# Patient Record
(demographics unavailable — no encounter records)

---

## 2024-10-29 NOTE — ASSESSMENT
[FreeTextEntry1] : -- # Nephrotic syndrome (FSGS/podocytopothy) in remission on  bid for 2 years now with recurrence on CSA 50/25. CSA stopped due to BANDAR c/w ATN related to nephrosis. No other cause identified. Switched to MMF. * Cont MMF 1000 bid. * Recheck labs. * Second opinion with Dr. Butcher for consideration of rituximab or other therapies. D/W him. Will wait until remission achieved to consider. Ritixumab not proven for steroid-resistant disease. * Therapies for kidney disease: blood pressure control; MMF; other evidence-based therapies recommended including exercise, a plant-based lower oxalate diet, and 400 mcg folic acid daily * Cardiovascular disease prevention: counseling on healthy diet, physical acivity, weight loss, alcohol limitation, blood pressure control * A counseling information sheet on CKD has been given (which they have been instructed to read). * The patient has been counseled that chronic kidney disease is a significant condition and regular office follow-up with me (at least every 2-3 month for now) is important for monitoring and their health, and that it is their responsibility to make a follow-up appointment. * The patient has been counseled never to stop taking their medications without discussing it with me or another doctor. * The patient has been counseled on avoiding NSAIDs. * The patient has been counseled on risk of worsening kidney function and instructed to immediately call and speak with me and go immediately to ER with any severe symptoms, nausea, vomiting, diarrhea, chest pain, or shortness of breath.  # HTN/fluid overload. * Continue torsemide 20 qd, coreg. Will hold on adding losartan given relatively controlled BP.  * A counseling information sheet on blood pressure and staying healthy has been given (which they have been instructed to read). * The patient has been counseled to check their BP at home with an automatic arm cuff, write down the readings, and reach me directly on the phone immediately if they are persistently > 180 systolic or if SBP is less than 100 or if lightheadedness develops. They were counseled to bring in all blood pressure readings and medications next visit. * The patient has been counseled that regular office follow-up (at least every 2-3 month for now) is important for monitoring and for their health, and that it is their responsibility to make follow up appointments. * The patient also has been counseled that they must never stop or change any medications without discussing this with me (or another physician).

## 2024-10-29 NOTE — PHYSICAL EXAM
[General Appearance - Alert] : alert [General Appearance - In No Acute Distress] : in no acute distress [Auscultation Breath Sounds / Voice Sounds] : lungs were clear to auscultation bilaterally [Heart Sounds] : normal S1 and S2 [Heart Sounds Gallop] : no gallops [Murmurs] : no murmurs [Heart Sounds Pericardial Friction Rub] : no pericardial rub [FreeTextEntry1] : pitting ankles  [Abdomen Soft] : soft [Abdomen Tenderness] : non-tender [] : no hepato-splenomegaly [Abdomen Mass (___ Cm)] : no abdominal mass palpated [Cervical Lymph Nodes Enlarged Posterior Bilaterally] : posterior cervical [Cervical Lymph Nodes Enlarged Anterior Bilaterally] : anterior cervical [Supraclavicular Lymph Nodes Enlarged Bilaterally] : supraclavicular

## 2024-10-29 NOTE — HISTORY OF PRESENT ILLNESS
[FreeTextEntry1] : Self-referred for nephrotic syndrome. PCP: Jeuss Briggs [phone] Biopsy: [facility]   * Proteinuria decreased to 3.6 grams. eGFR 44 (avg. of CKD-EPIcr & CKD-EPIcys). Albumin increased to 3.9. On MMF 1000 bid. Feeling well. * BP controlled. BP 120s at home. No SOB with exertion. No CP. No lightheadedness. Compliant with medications. * On coreg 25 mg *  1.5 mg twice daily. * Lisinopril was previously stopped due to BANDAR and hyerp K.   Previous history (): * Urine protein decreased to 3.6. Albumin increasd to 3.9. * eGFR 44 (avg. of CKD-EPIcr & CKD-EPIcys). * Edema improved, on torsemide 20 mg once daily. * BP controlled. BP 120s - 130s at home. No SOB with exertion. No CP. No lightheadedness. Compliant with medications. On MMF 1000 bid. * Following up with local physician for thyroid management. * Bicarb increased to 23. Now off sodium bicarb.   Previous history (): * Proteinuria decreased from 6.2. CKD improved, creatinine 1.35. Albumin increased to 3.6. eGFR 37 (avg. of CKD-EPIcr & CKD-EPIcys). Feels well. Edema improved. Still on diuretic. On MMF 1000 bid. * HTN borderline uncontrolled. BP 130s - 140s at home. No lightheadedness. No CP/SOB. Compliant with medications. * Following up with local physican.   Previous history (): * Proteinuria stable at 13 grams. On 1000 mg MMF twice daily. * Creatinine  to 1.55. Albumin 3.1. Edema stable. On eliquis. *  HTN uncontrolled. BP 130s - 150s at home. HR 70 - 80s. No lightheadedness. No CP/SOB. Compliant with medications.   Previous history (): * Proteinuria stable at 15 grams. * On 1000 mg mycophenolate twice daily. * HTN uncontrolled. BP 150s at home. No lightheadedness. No CP/SOB. Compliant with medications.  * Stable edema. * CKD stable, creatinine 1.42.   Previous history (): * Diarrhea starting 2 days, multiple times yesterday, but now improved today.  No watery bowel movement today. 2 BMs today. Now improving. No blood in stool, nausea or vomiting, fevers, or chills. Able to eat/drink. Not dehydrated. Covid negative. She is on 1500 mg mycophenolate. * CKD stable, creatinine 1.74.   Previous history ():  Persistent nephrotic syndrome. MMF started 1 month ago. Increase to 500 bid 3 weeks ago. 18 grams proteinuria. * Edema stable and tolerable on 20 bid torsemide. * BANDAR improved, creatinine 1.6. * Hyperkalemia improved, K 4.9.   *Previous history ():  K decresed to 4.8. Creatinine decreased to 1.96. Cyclosporine was stopped and MMF was started. Torsemide increased from 10 to 20 mg daily. Edema slightly improved.  Proteinuria 10 g /d.* HTN uncontrolled.  - 150s at home. No lightheadedness. No CP/SOB. Compliant with medications. * Albumin 2.8. On eliquis.   Previous history (): * Creatinine increased to 1.91 with K of 5.4. Lisinopril stopped. Low K diet. Torsemide increased to 20. No RBCs last visit. Swelling improved on torsemide.   Previous history (): Biopsy obtained: podocytopathy (FSGS). CSA increased to 100 bid on  for recurrence of FSGS upon tapering. Creatinine 1.15. (Disease started 2020 and in remission since 2021, recurrence 2023.) *  Took CSA this morning. * Edema stable. Feels uncomfotably swollen without siognificant diuresis with torsemide. Levothyroxine was increasd to 112.    Previous history (): * Nephrotic syndrome was in remission since 2021. Cyclosporine was initially 100 mg bid and was tapered to 50/25. Umalb 30 - 50 on . Checked U/A weekly. 1 week ago had abrupt onset of persistent 4+ proteinuria and CSA was increased to 50/50. Increase in LE edema. No chest pain/SOB. On eliquis. Knee tightness but no calf discomfort. BP controlled.  - 140s at home. No lightheadedness. No CP/SOB. Compliant with medications. She had increased lisinopril to 10 mg on her own (and will now decrease to 5).    Previous history (): * Nephrotic syndrome in remission since 2021. Cyclosporine being tapered. * HTN controlled. Bp 120 - 130s. No lightheadedness. No CP/SOB. Compliant with medications. * No albumin in urine. Creatinine stable.   Previous history ([id number]): * HTN borderline uncontrolled. BP 130s but she doesn't rest at home. No lightheadedness. No CP/SOB. Compliant with medications. * Albumin 21. * CKD stable at 1.02. * CSA level decreased to 52. No protein in urine. REMISSION ACHIEVED 2021.    Previous history (): * CKD imporved to 1.16. * No albuminuria. In remission since 2022. * * HTN controlled. No lightheadedness. No CP/SOB. Compliant with medications.  * Cyclosporine level now 60s on 75/50.  * Renal biopsy results pending. She has requested records multiple times.   Previous history ([id number]): * Creatinine decreased to 1.24. * No albuminuria. In remission since 2022. * CSA decreased to 467 to 60 (increased due to paxlovid) and was restarted. * HTN controlled. BP 130s at home. (Doesn't relax.) No lightheadedness. No CP/SOB. Compliant with medications.   Previous history ([id number]): * HTN controlled. No lightheadedness. BP 120s at home. No CP/SOB. Compliant with medications. * Swab+ for Covid on . Exposed to granddaughter. NOW SWAB ANTIGEN NEGATIVE. She started paxlovid 3 days ago.   Diagnosed with Covid by PCR/Antigen:   No severe symptoms (chest pain, light headedness, shortness of breath, feeling very ill). No moderate dyspnea (that interferes with daily activities).   Symptoms started:  Symptoms: Fatigue. No SOB or fever.   Vaccination status: x 2, no booster.   Counseling given. Counseling on warning symptoms given. These include new onset of dyspnea, worsening dyspnea, dizziness, and mental status changes such as confusion. Patients are educated about the time course of symptoms and the possible development of respiratory decline that may occur, on average, one week after the onset of illness.    In addition, I assessed the availability of support at home, ensure that they know who to call should they need assistance, and reinforce when and how to access emergency medical services.  Advised on hydration, symptomatic relief, ambulation and advancing activity, and isolation.   Previous history (): * HTN controlled. BP 120s at home. No lightheadedness. No CP/SOB. Compliant with medications. * On CSA 75/75.  Last taken 13 hours ago. No albuminuria last visit. * eGFR 36 by cystatin C. * On eliquis for AF without bleeding. * Renal biopsy results pending. She has requested records multiple times.   Previous history (): * Urine protein 30 at home. Malb 49. Cr 1.1. . Remission 2021. * HTN controlled. BP 130s at home. No lightheadedness. Compliant with medications.  * On eliquis for AF without bleeding. * Renal biopsy results pending. * CSA decreased to 100/75.   Previous history ():   * She was on steroids for 1 year in . CSA was started in  of this year. Last took cyclosprine 15 hours ago.  SHE BELIEVES REMISSION WAS ACHIEVED IN .. * 122 mg albuminuria last visit. Creatinine 1.15, eGFR 35 - 50. Albumin 4.3. * HTN controlled. No lightheadedness. Compliant with medications.  * On eliquis for AF without bleeding. * Renal biopsy results pending.   Previous history (): * Increase weight by 5 pounds but no LE swelling. * HTN controlled. No lightheadedness. * On CSA. * On eliquis for AF without bleeding.   Previous history (): Hospitalized at [facility] 2020 for fluid overload. Diagnosed with nephrotic syndrome. A kidney biopsy was performed and she has requested the records. Treated initially with steroids followed by cyclosporine. She was followed in the fellows clinic and wishes to switch her care to me. Her proteinuria is currently in remission. She has been in remission since May - 2021. Reportedly kidney function is normal. Last took CSA at 10am.    * HTN borderline uncontrolled. BP 130s/80s at home. No lightheadedness. Compliant with medications.   Vacinnated against Covid ([employer]) 2021.

## 2025-01-30 NOTE — ASSESSMENT
[FreeTextEntry1] : -- # Nephrotic syndrome (FSGS/podocytopothy) in remission on  bid for 2 years now with recurrence on CSA 50/25. CSA stopped due to BANDAR c/w ATN related to nephrosis. No other cause identified. Switched to MMF. Now in partial remission.  * Cont MMF 1000 bid. * Recheck labs before next visit.  * Second opinion with Dr. Butcher for consideration of rituximab or other therapies. D/W him. Will wait until remission achieved to consider. Ritixumab not proven for steroid-resistant disease. * Therapies for kidney disease: blood pressure control; MMF; other evidence-based therapies recommended including exercise, a plant-based lower oxalate diet, and 400 mcg folic acid daily * Cardiovascular disease prevention: counseling on healthy diet, physical acivity, weight loss, alcohol limitation, blood pressure control * A counseling information sheet on CKD has been given (which they have been instructed to read). * The patient has been counseled that chronic kidney disease is a significant condition and regular office follow-up with me (at least every 3 month for now) is important for monitoring and their health, and that it is their responsibility to make a follow-up appointment. * The patient has been counseled never to stop taking their medications without discussing it with me or another doctor. * The patient has been counseled on avoiding NSAIDs. * The patient has been counseled on risk of worsening kidney function and instructed to immediately call and speak with me and go immediately to ER with any severe symptoms, nausea, vomiting, diarrhea, chest pain, or shortness of breath.  # HTN/fluid overload. * Continue torsemide 20 qd, coreg. Will hold on adding losartan given relatively controlled BP. * A counseling information sheet on blood pressure and staying healthy has been given (which they have been instructed to read). * The patient has been counseled to check their BP at home with an automatic arm cuff, write down the readings, and reach me directly on the phone immediately if they are persistently > 180 systolic or if SBP is less than 100 or if lightheadedness develops. They were counseled to bring in all blood pressure readings and medications next visit. * The patient has been counseled that regular office follow-up (at least every 2-3 month for now) is important for monitoring and for their health, and that it is their responsibility to make follow up appointments. * The patient also has been counseled that they must never stop or change any medications without discussing this with me (or another physician).  # Hypothyroid. * Continue levothyroxine.    # AF. * Cont eliquis.

## 2025-01-30 NOTE — HISTORY OF PRESENT ILLNESS
[Home] : at home, [unfilled] , at the time of the visit. [Verbal consent obtained from patient] : the patient, [unfilled] [FreeTextEntry1] : Self-referred for nephrotic syndrome. PCP: Jesus Briggs [phone]) Biopsy: [facility]   * Urine protein down to 600 mg. eGFR 45 (avg. of CKD-EPIcr & CKD-EPIcys). On MMF 1000 bid.  * BP controlled. BP 120s - 130s at home. No SOB with exertion. No CP. No lightheadedness (pre-syncope). Adherent to medications.   Previous history (): * Proteinuria decreased to 3.6 grams. eGFR 44 (avg. of CKD-EPIcr & CKD-EPIcys). Albumin increased to 3.9. On MMF 1000 bid. Feeling well. * BP controlled. BP 120s at home. No SOB with exertion. No CP. No lightheadedness. Compliant with medications. * On coreg 25 mg *  1.5 mg twice daily. * Lisinopril was previously stopped due to BANDAR and hyerp K.   Previous history (): * Urine protein decreased to 3.6. Albumin increasd to 3.9. * eGFR 44 (avg. of CKD-EPIcr & CKD-EPIcys). * Edema improved, on torsemide 20 mg once daily. * BP controlled. BP 120s - 130s at home. No SOB with exertion. No CP. No lightheadedness. Compliant with medications. On MMF 1000 bid. * Following up with local physician for thyroid management. * Bicarb increased to 23. Now off sodium bicarb.   Previous history (): * Proteinuria decreased from 6.2. CKD improved, creatinine 1.35. Albumin increased to 3.6. eGFR 37 (avg. of CKD-EPIcr & CKD-EPIcys). Feels well. Edema improved. Still on diuretic. On MMF 1000 bid. * HTN borderline uncontrolled. BP 130s - 140s at home. No lightheadedness. No CP/SOB. Compliant with medications. * Following up with local physican.   Previous history (): * Proteinuria stable at 13 grams. On 1000 mg MMF twice daily. * Creatinine  to 1.55. Albumin 3.1. Edema stable. On eliquis. *  HTN uncontrolled. BP 130s - 150s at home. HR 70 - 80s. No lightheadedness. No CP/SOB. Compliant with medications.   Previous history (): * Proteinuria stable at 15 grams. * On 1000 mg mycophenolate twice daily. * HTN uncontrolled. BP 150s at home. No lightheadedness. No CP/SOB. Compliant with medications.  * Stable edema. * CKD stable, creatinine 1.42.   Previous history ([id number]): * Diarrhea starting 2 days, multiple times yesterday, but now improved today.  No watery bowel movement today. 2 BMs today. Now improving. No blood in stool, nausea or vomiting, fevers, or chills. Able to eat/drink. Not dehydrated. Covid negative. She is on 1500 mg mycophenolate. * CKD stable, creatinine 1.74.   Previous history ([id number]):  Persistent nephrotic syndrome. MMF started 1 month ago. Increase to 500 bid 3 weeks ago. 18 grams proteinuria. * Edema stable and tolerable on 20 bid torsemide. * BANDAR improved, creatinine 1.6. * Hyperkalemia improved, K 4.9.   *Previous history ([id number]):  K decresed to 4.8. Creatinine decreased to 1.96. Cyclosporine was stopped and MMF was started. Torsemide increased from 10 to 20 mg daily. Edema slightly improved.  Proteinuria 10 g /d.* HTN uncontrolled.  - 150s at home. No lightheadedness. No CP/SOB. Compliant with medications. * Albumin 2.8. On eliquis.   Previous history ([id number]): * Creatinine increased to 1.91 with K of 5.4. Lisinopril stopped. Low K diet. Torsemide increased to 20. No RBCs last visit. Swelling improved on torsemide.   Previous history ([id number]): Biopsy obtained: podocytopathy (FSGS). CSA increased to 100 bid on  for recurrence of FSGS upon tapering. Creatinine 1.15. (Disease started 2020 and in remission since 2021, recurrence 2023.) *  Took CSA this morning. * Edema stable. Feels uncomfotably swollen without siognificant diuresis with torsemide. Levothyroxine was increasd to 112.    Previous history (): * Nephrotic syndrome was in remission since 2021. Cyclosporine was initially 100 mg bid and was tapered to 50/25. Umalb 30 - 50 on . Checked U/A weekly. 1 week ago had abrupt onset of persistent 4+ proteinuria and CSA was increased to 50/50. Increase in LE edema. No chest pain/SOB. On eliquis. Knee tightness but no calf discomfort. BP controlled.  - 140s at home. No lightheadedness. No CP/SOB. Compliant with medications. She had increased lisinopril to 10 mg on her own (and will now decrease to 5).    Previous history (): * Nephrotic syndrome in remission since 2021. Cyclosporine being tapered. * HTN controlled. Bp 120 - 130s. No lightheadedness. No CP/SOB. Compliant with medications. * No albumin in urine. Creatinine stable.   Previous history (): * HTN borderline uncontrolled. BP 130s but she doesn't rest at home. No lightheadedness. No CP/SOB. Compliant with medications. * Albumin 21. * CKD stable at 1.02. * CSA level decreased to 52. No protein in urine. REMISSION ACHIEVED 2021.    Previous history (): * CKD imporved to 1.16. * No albuminuria. In remission since 2022. * * HTN controlled. No lightheadedness. No CP/SOB. Compliant with medications.  * Cyclosporine level now 60s on 75/50.  * Renal biopsy results pending. She has requested records multiple times.   Previous history (): * Creatinine decreased to 1.24. * No albuminuria. In remission since 2022. * CSA decreased to 467 to 60 (increased due to paxlovid) and was restarted. * HTN controlled. BP 130s at home. (Doesn't relax.) No lightheadedness. No CP/SOB. Compliant with medications.   Previous history (): * HTN controlled. No lightheadedness. BP 120s at home. No CP/SOB. Compliant with medications. * Swab+ for Covid on . Exposed to granddaughter. NOW SWAB ANTIGEN NEGATIVE. She started paxlovid 3 days ago.   Diagnosed with Covid by PCR/Antigen:   No severe symptoms (chest pain, light headedness, shortness of breath, feeling very ill). No moderate dyspnea (that interferes with daily activities).   Symptoms started:  Symptoms: Fatigue. No SOB or fever.   Vaccination status: x 2, no booster.   Counseling given. Counseling on warning symptoms given. These include new onset of dyspnea, worsening dyspnea, dizziness, and mental status changes such as confusion. Patients are educated about the time course of symptoms and the possible development of respiratory decline that may occur, on average, one week after the onset of illness.    In addition, I assessed the availability of support at home, ensure that they know who to call should they need assistance, and reinforce when and how to access emergency medical services.  Advised on hydration, symptomatic relief, ambulation and advancing activity, and isolation.   Previous history (): * HTN controlled. BP 120s at home. No lightheadedness. No CP/SOB. Compliant with medications. * On CSA 75/75.  Last taken 13 hours ago. No albuminuria last visit. * eGFR 36 by cystatin C. * On eliquis for AF without bleeding. * Renal biopsy results pending. She has requested records multiple times.   Previous history (): * Urine protein 30 at home. Malb 49. Cr 1.1. . Remission 2021. * HTN controlled. BP 130s at home. No lightheadedness. Compliant with medications.  * On eliquis for AF without bleeding. * Renal biopsy results pending. * CSA decreased to 100/75.   Previous history ():   * She was on steroids for 1 year in . CSA was started in  of this year. Last took cyclosprine 15 hours ago.  SHE BELIEVES REMISSION WAS ACHIEVED IN .. * 122 mg albuminuria last visit. Creatinine 1.15, eGFR 35 - 50. Albumin 4.3. * HTN controlled. No lightheadedness. Compliant with medications.  * On eliquis for AF without bleeding. * Renal biopsy results pending.   Previous history (): * Increase weight by 5 pounds but no LE swelling. * HTN controlled. No lightheadedness. * On CSA. * On eliquis for AF without bleeding.   Previous history (): Hospitalized at [facility] 2020 for fluid overload. Diagnosed with nephrotic syndrome. A kidney biopsy was performed and she has requested the records. Treated initially with steroids followed by cyclosporine. She was followed in the fellows clinic and wishes to switch her care to me. Her proteinuria is currently in remission. She has been in remission since May - 2021. Reportedly kidney function is normal. Last took CSA at 10am.    * HTN borderline uncontrolled. BP 130s/80s at home. No lightheadedness. Compliant with medications.   Vacinnated against Covid ([employer]) 2021.